# Patient Record
Sex: MALE | Race: BLACK OR AFRICAN AMERICAN | NOT HISPANIC OR LATINO | Employment: FULL TIME | URBAN - METROPOLITAN AREA
[De-identification: names, ages, dates, MRNs, and addresses within clinical notes are randomized per-mention and may not be internally consistent; named-entity substitution may affect disease eponyms.]

---

## 2022-08-15 ENCOUNTER — TRANSCRIBE ORDERS (OUTPATIENT)
Dept: PHYSICAL THERAPY | Facility: CLINIC | Age: 62
End: 2022-08-15

## 2022-08-15 DIAGNOSIS — S43.402A SPRAIN OF LEFT SHOULDER, UNSPECIFIED SHOULDER SPRAIN TYPE, INITIAL ENCOUNTER: Primary | ICD-10-CM

## 2022-08-15 DIAGNOSIS — S93.402A SPRAIN OF LEFT ANKLE, UNSPECIFIED LIGAMENT, INITIAL ENCOUNTER: ICD-10-CM

## 2022-08-17 ENCOUNTER — TREATMENT (OUTPATIENT)
Dept: PHYSICAL THERAPY | Facility: CLINIC | Age: 62
End: 2022-08-17

## 2022-08-17 ENCOUNTER — TELEPHONE (OUTPATIENT)
Dept: PHYSICAL THERAPY | Facility: CLINIC | Age: 62
End: 2022-08-17

## 2022-08-17 DIAGNOSIS — S43.402D SPRAIN OF LEFT SHOULDER, UNSPECIFIED SHOULDER SPRAIN TYPE, SUBSEQUENT ENCOUNTER: Primary | ICD-10-CM

## 2022-08-17 DIAGNOSIS — S93.402D SPRAIN OF LEFT ANKLE, UNSPECIFIED LIGAMENT, SUBSEQUENT ENCOUNTER: ICD-10-CM

## 2022-08-17 PROCEDURE — 97161 PT EVAL LOW COMPLEX 20 MIN: CPT | Performed by: PHYSICAL THERAPIST

## 2022-08-17 PROCEDURE — 97112 NEUROMUSCULAR REEDUCATION: CPT | Performed by: PHYSICAL THERAPIST

## 2022-08-17 PROCEDURE — 97110 THERAPEUTIC EXERCISES: CPT | Performed by: PHYSICAL THERAPIST

## 2022-08-17 NOTE — PROGRESS NOTES
Physical Therapy Initial Evaluation and Plan of Care    Patient: Jesus Benitez   : 1960  Diagnosis/ICD-10 Code:  Sprain of left shoulder, unspecified shoulder sprain type, subsequent encounter [S43.402D]  Referring practitioner: Ramón Guy PA-C  Date of Initial Visit: 2022  Today's Date: 2022  Patient seen for 1 session         Visit Diagnoses:    ICD-10-CM ICD-9-CM   1. Sprain of left shoulder, unspecified shoulder sprain type, subsequent encounter  S43.402D V58.89     840.9   2. Sprain of left ankle, unspecified ligament, subsequent encounter  S93.402D V58.89     845.00           Subjective Evaluation    History of Present Illness  Date of onset: 2022  Mechanism of injury: Patient fell of the side of a step and landed on his left side. He states that he landed on the rail, but is unsure how his shoulder hit the rail.  No history of shoulder or ankle injuries.      Patient Occupation: Facilities Management   Precautions and Work Restrictions: light dutyPain  Current pain ratin  At worst pain ratin  Location: left anterior shoulder, left anterior and lateral ankle  Quality: dull ache and burning  Alleviating factors: finding a position to relieve the pain.  Aggravating factors: movement and overhead activity  Progression: improved    Hand dominance: ambidextrous             Objective          Palpation     Additional Palpation Details  No TTP present.  TTP to the left ATFL.    Active Range of Motion   Left Shoulder   Flexion: Left shoulder active forward flexion: about 95.   Abduction: Left shoulder active abduction: WNL, painful arc present. with pain  External rotation 0°: Left shoulder active external rotation at 0 degrees: WNL.   Internal rotation BTB: L1   Left Ankle/Foot   Dorsiflexion (ke): WFL  Plantar flexion: WFL  Inversion: WFL  Eversion: WFL    Strength/Myotome Testing     Left Shoulder     Planes of Motion   Flexion: 4+   Abduction: 4+   External rotation at 0°: 4    Internal rotation at 0°: 5     Left Ankle/Foot   Dorsiflexion: 5  Plantar flexion: 5  Inversion: 4  Eversion: 4    Tests     Left Shoulder   Positive empty can and Hawkin's.     Additional Tests Details  - Inversion and - Eversion Stress tests          Assessment & Plan     Assessment  Impairments: abnormal or restricted ROM, activity intolerance, impaired physical strength, lacks appropriate home exercise program and pain with function    Assessment details: Patient presents with c/o pain, TTP, limited shoulder AROM, decreased strength and positive special testing which is limiting his ability to perform full job duties and ADL'S.    Barriers to therapy: none  Prognosis: good  Prognosis details: STG's to be met by 2 weeks  1)  Independent with HEP  2)  Decrease pain by 50% or more  3)  AROM WNL for the left shoulder without pain  4)  No TTP to the left ankle    LTG's to be met by 4 weeks  1)  Independent with HEP progression  2)  Decrease pain by 75% or more  3)  Increase strength for the left shoulder to 4+/5  4)  Negative special testing  5)  Patient to lift waist to shoulder to up 30 lbs  6)  Patient to perform ADL'S without limitations       Plan  Therapy options: will be seen for skilled therapy services  Planned therapy interventions: neuromuscular re-education, strengthening, stretching, therapeutic activities and home exercise program  Frequency: 3x week  Duration in weeks: 4  Treatment plan discussed with: patient            Timed:         Manual Therapy:    0     mins  17343;     Therapeutic Exercise:    16     mins  83823;     Neuromuscular Viktor:    8    mins  25711;    Therapeutic Activity:     0     mins  58706;     Gait Trainin     mins  08258;     Ultrasound:     0     mins  69667;          Un-Timed:  Electrical Stimulation:    0     mins  16032 ( );    Low Eval     14     Mins  73364  Mod Eval     0     Mins  11843  High Eval                       0     Mins  55608        Timed  Treatment:   24   mins   Total Treatment:     38   mins          PT: Gael Luciano, PT     [place] License [id number]  Electronically signed by Gael Luciano PT, 08/17/22, 7:48 AM EDT    Certification Period: 8/17/2022 thru 11/14/2022  I certify that the therapy services are furnished while this patient is under my care.  The services outlined above are required by this patient, and will be reviewed every 90 days.    Ramón Guy Pa-c  [address] [place] [address]   NPI: [id number]      Gael Luciano PT   License number: [id number]        Physician Signature:__________________________________________________    PHYSICIAN: Ramón Guy PA-C      DATE:     Please sign and return via fax to [email or web address] . Thank you, [facility].

## 2022-08-22 ENCOUNTER — TREATMENT (OUTPATIENT)
Dept: PHYSICAL THERAPY | Facility: CLINIC | Age: 62
End: 2022-08-22

## 2022-08-22 DIAGNOSIS — S93.402D SPRAIN OF LEFT ANKLE, UNSPECIFIED LIGAMENT, SUBSEQUENT ENCOUNTER: ICD-10-CM

## 2022-08-22 DIAGNOSIS — S43.402D SPRAIN OF LEFT SHOULDER, UNSPECIFIED SHOULDER SPRAIN TYPE, SUBSEQUENT ENCOUNTER: Primary | ICD-10-CM

## 2022-08-22 PROCEDURE — 97530 THERAPEUTIC ACTIVITIES: CPT | Performed by: PHYSICAL THERAPIST

## 2022-08-22 PROCEDURE — 97110 THERAPEUTIC EXERCISES: CPT | Performed by: PHYSICAL THERAPIST

## 2022-08-22 NOTE — PROGRESS NOTES
Physical Therapy Daily Treatment Note      Patient: Jesus Benitez   : 1960  Referring practitioner: Ramón Guy PA-C  Date of Initial Visit: Type: THERAPY  Noted: 2022  Today's Date: 2022  Patient seen for 2 sessions       Visit Diagnoses:    ICD-10-CM ICD-9-CM   1. Sprain of left shoulder, unspecified shoulder sprain type, subsequent encounter  S43.402D V58.89     840.9   2. Sprain of left ankle, unspecified ligament, subsequent encounter  S93.402D V58.89     845.00         Subjective Patient reports that the ankle is alright, reports a little pain in it; reports that the shoulder is doing better.    Objective   See Exercise, Manual, and Modality Logs for complete treatment.       Assessment/Plan  Held the KT secondary to the patient stating that it broke him out.  Progressed the shoulder and scapular strengthening exercises, in addition to adding weight bearing activities for the ankle.  Patient tolerated the progression of exercises without visual or verbal c/o pain in the shoulder or ankle.      Timed:         Manual Therapy:    0     mins  43711;     Therapeutic Exercise:    20     mins  91439;     Neuromuscular Viktor:    0    mins  65081;    Therapeutic Activity:     10     mins  52956;     Gait Trainin     mins  08872;     Ultrasound:     0     mins  11748;    Work Conditioning      __0_  mins  80538      Un-Timed:  Electrical Stimulation:    0     mins  13920 ( );        Timed Treatment:   30   mins   Total Treatment:     30   mins    Gael Luciano, PT  [place] License: [id number]

## 2022-08-24 ENCOUNTER — TREATMENT (OUTPATIENT)
Dept: PHYSICAL THERAPY | Facility: CLINIC | Age: 62
End: 2022-08-24

## 2022-08-24 DIAGNOSIS — S43.402D SPRAIN OF LEFT SHOULDER, UNSPECIFIED SHOULDER SPRAIN TYPE, SUBSEQUENT ENCOUNTER: Primary | ICD-10-CM

## 2022-08-24 DIAGNOSIS — S93.402D SPRAIN OF LEFT ANKLE, UNSPECIFIED LIGAMENT, SUBSEQUENT ENCOUNTER: ICD-10-CM

## 2022-08-24 PROCEDURE — 97110 THERAPEUTIC EXERCISES: CPT | Performed by: PHYSICAL THERAPIST

## 2022-08-24 NOTE — PROGRESS NOTES
Physical Therapy Daily Treatment Note      Patient: Jesus Benitez   : 1960  Referring practitioner: Ramón Guy PA-C  Date of Initial Visit: Type: THERAPY  Noted: 2022  Today's Date: 2022  Patient seen for 3 sessions       Visit Diagnoses:    ICD-10-CM ICD-9-CM   1. Sprain of left shoulder, unspecified shoulder sprain type, subsequent encounter  S43.402D V58.89     840.9   2. Sprain of left ankle, unspecified ligament, subsequent encounter  S93.402D V58.89     845.00         Subjective Patient reports that the ankle feels alright, currently denies pain; shoulder feels better, currently denies pain.    Objective   See Exercise, Manual, and Modality Logs for complete treatment.       Assessment/Plan  Subjective reports are much improved from the previous visits.  Progressed the strengthening exercises today, which the patient tolerated very well.  No reports of pain with the routine.  Patient feels that he can return to full job duties at this time and based on how he presents, I feel that he should be able to do so.      Timed:         Manual Therapy:    0     mins  07670;     Therapeutic Exercise:    24     mins  70961;     Neuromuscular Viktor:    0    mins  46990;    Therapeutic Activity:     0     mins  73911;     Gait Trainin     mins  18312;     Ultrasound:     0     mins  38209;    Work Conditioning      __0_  mins  98715      Un-Timed:  Electrical Stimulation:    0     mins  81174 ( );        Timed Treatment:   24   mins   Total Treatment:     24   mins    Gael Luciano, PT  [place] License: [id number]

## 2022-09-12 ENCOUNTER — OFFICE VISIT (OUTPATIENT)
Dept: WOUND CARE | Facility: HOSPITAL | Age: 62
End: 2022-09-12

## 2022-09-12 PROCEDURE — G0463 HOSPITAL OUTPT CLINIC VISIT: HCPCS

## 2022-10-10 ENCOUNTER — DOCUMENTATION (OUTPATIENT)
Dept: PHYSICAL THERAPY | Facility: CLINIC | Age: 62
End: 2022-10-10